# Patient Record
Sex: MALE | Race: BLACK OR AFRICAN AMERICAN | NOT HISPANIC OR LATINO | URBAN - METROPOLITAN AREA
[De-identification: names, ages, dates, MRNs, and addresses within clinical notes are randomized per-mention and may not be internally consistent; named-entity substitution may affect disease eponyms.]

---

## 2019-11-22 ENCOUNTER — HOSPITAL ENCOUNTER (OUTPATIENT)
Dept: FAMILY MEDICINE CLINIC | Facility: CLINIC | Age: 34
Discharge: HOME | End: 2019-11-22
Attending: FAMILY MEDICINE

## 2019-11-22 ENCOUNTER — OFFICE VISIT CONVERTED (OUTPATIENT)
Dept: FAMILY MEDICINE CLINIC | Facility: CLINIC | Age: 34
End: 2019-11-22
Attending: FAMILY MEDICINE

## 2019-11-22 LAB
ALBUMIN SERPL-MCNC: 4.7 G/DL (ref 3.5–5)
ALBUMIN/GLOB SERPL: 1.5 {RATIO} (ref 1.4–2.6)
ALP SERPL-CCNC: 52 U/L (ref 53–128)
ALT SERPL-CCNC: 21 U/L (ref 10–40)
ANION GAP SERPL CALC-SCNC: 17 MMOL/L (ref 8–19)
AST SERPL-CCNC: 28 U/L (ref 15–50)
BASOPHILS # BLD AUTO: 0.04 10*3/UL (ref 0–0.2)
BASOPHILS NFR BLD AUTO: 0.7 % (ref 0–3)
BILIRUB SERPL-MCNC: 0.29 MG/DL (ref 0.2–1.3)
BUN SERPL-MCNC: 12 MG/DL (ref 5–25)
BUN/CREAT SERPL: 11 {RATIO} (ref 6–20)
CALCIUM SERPL-MCNC: 9.8 MG/DL (ref 8.7–10.4)
CHLORIDE SERPL-SCNC: 100 MMOL/L (ref 99–111)
CONV ABS IMM GRAN: 0.01 10*3/UL (ref 0–0.2)
CONV CO2: 26 MMOL/L (ref 22–32)
CONV IMMATURE GRAN: 0.2 % (ref 0–1.8)
CONV TOTAL PROTEIN: 7.8 G/DL (ref 6.3–8.2)
CREAT UR-MCNC: 1.1 MG/DL (ref 0.7–1.2)
DEPRECATED RDW RBC AUTO: 44.3 FL (ref 35.1–43.9)
EOSINOPHIL # BLD AUTO: 0.38 10*3/UL (ref 0–0.7)
EOSINOPHIL # BLD AUTO: 6.2 % (ref 0–7)
ERYTHROCYTE [DISTWIDTH] IN BLOOD BY AUTOMATED COUNT: 12.9 % (ref 11.6–14.4)
GFR SERPLBLD BASED ON 1.73 SQ M-ARVRAT: >60 ML/MIN/{1.73_M2}
GLOBULIN UR ELPH-MCNC: 3.1 G/DL (ref 2–3.5)
GLUCOSE SERPL-MCNC: 113 MG/DL (ref 70–99)
HCT VFR BLD AUTO: 42 % (ref 42–52)
HGB BLD-MCNC: 13.9 G/DL (ref 14–18)
LYMPHOCYTES # BLD AUTO: 2.2 10*3/UL (ref 1–5)
LYMPHOCYTES NFR BLD AUTO: 36.1 % (ref 20–45)
MCH RBC QN AUTO: 31.2 PG (ref 27–31)
MCHC RBC AUTO-ENTMCNC: 33.1 G/DL (ref 33–37)
MCV RBC AUTO: 94.4 FL (ref 80–96)
MONOCYTES # BLD AUTO: 0.65 10*3/UL (ref 0.2–1.2)
MONOCYTES NFR BLD AUTO: 10.7 % (ref 3–10)
NEUTROPHILS # BLD AUTO: 2.82 10*3/UL (ref 2–8)
NEUTROPHILS NFR BLD AUTO: 46.1 % (ref 30–85)
NRBC CBCN: 0 % (ref 0–0.7)
OSMOLALITY SERPL CALC.SUM OF ELEC: 289 MOSM/KG (ref 273–304)
PLATELET # BLD AUTO: 237 10*3/UL (ref 130–400)
PMV BLD AUTO: 10.7 FL (ref 9.4–12.4)
POTASSIUM SERPL-SCNC: 4.1 MMOL/L (ref 3.5–5.3)
RBC # BLD AUTO: 4.45 10*6/UL (ref 4.7–6.1)
SODIUM SERPL-SCNC: 139 MMOL/L (ref 135–147)
WBC # BLD AUTO: 6.1 10*3/UL (ref 4.8–10.8)

## 2020-02-20 ENCOUNTER — HOSPITAL ENCOUNTER (OUTPATIENT)
Dept: OTHER | Facility: HOSPITAL | Age: 35
Discharge: HOME | End: 2020-02-20
Attending: PHYSICIAN ASSISTANT

## 2020-03-17 ENCOUNTER — CONVERSION ENCOUNTER (OUTPATIENT)
Dept: FAMILY MEDICINE CLINIC | Facility: CLINIC | Age: 35
End: 2020-03-17

## 2020-03-17 ENCOUNTER — OFFICE VISIT CONVERTED (OUTPATIENT)
Dept: FAMILY MEDICINE CLINIC | Facility: CLINIC | Age: 35
End: 2020-03-17
Attending: NURSE PRACTITIONER

## 2020-05-12 ENCOUNTER — OFFICE VISIT CONVERTED (OUTPATIENT)
Dept: FAMILY MEDICINE CLINIC | Facility: CLINIC | Age: 35
End: 2020-05-12
Attending: NURSE PRACTITIONER

## 2020-06-30 ENCOUNTER — OFFICE VISIT CONVERTED (OUTPATIENT)
Dept: FAMILY MEDICINE CLINIC | Facility: CLINIC | Age: 35
End: 2020-06-30
Attending: NURSE PRACTITIONER

## 2020-07-14 ENCOUNTER — HOSPITAL ENCOUNTER (OUTPATIENT)
Dept: URGENT CARE | Facility: CLINIC | Age: 35
Discharge: HOME | End: 2020-07-14

## 2020-11-23 ENCOUNTER — OFFICE VISIT CONVERTED (OUTPATIENT)
Dept: FAMILY MEDICINE CLINIC | Facility: CLINIC | Age: 35
End: 2020-11-23
Attending: FAMILY MEDICINE

## 2021-05-13 NOTE — PROGRESS NOTES
Progress Note      Patient Name: Javi Millan   Patient ID: [id number]   Sex: Male   YOB: 1985    Primary Care Provider: Adrianna Soliz MD   Referring Provider: Adrianna Soliz MD    Visit Date: November 23, 2020    Provider: Adrianna Soliz MD   Location: [facility]   Location Address: [address] [place] [address], [address]  [place], [place]  [id number]   Location Phone: [phone]          Chief Complaint  · Annual Physical Exam      History Of Present Illness  Javi Millan is a 34 year old /Black male who presents for evaluation and treatment of:      Pt is here for his annual physical exam.    Elevated blood pressure readings patient reports they have been using his parents blood pressure cuff to check his blood pressures and they have been averaging in the 140s systolic.  Patient has had occasional high blood pressures in the past and recorded in the office.  I will be giving him his own blood pressure cuff that he can report his own readings especially if he is having any headache symptoms.    Headaches patient reports he is waking up in the mornings occasionally with headache mainly in the right side of his temple.  Patient does not report any other symptoms.    Weight gain patient reports that he has gained approximately 60 to 70 pounds over the last few years.  Patient has a 12 pound weight increase since June and reports no change in activity or diet.  Patient will be having his labs checked fasting for thyroid disorders and hyperglycemia.    Asthma patient reports that he is needing to use his nebulizer machine to help with his breathing and shortness of breath.  Patient reports he still using his Breo daily.    Snoring- pt reports that he wakes up in the morning with a headache and he snores loudly which most likely is secondary to an airway obstruction and may be causing his breathing issues while sleeping.  I will be ordering a Sleep study.       Past  "Medical History  Asthma         Medication List  albuterol sulfate 2.5 mg /3 mL (0.083 %) inhalation solution for nebulization; BREO ELLIPTA 200-25MCG ORAL INH(30); Ventolin HFA 90 mcg/actuation inhalation HFA aerosol inhaler         Allergy List  NO KNOWN DRUG ALLERGIES       Allergies Reconciled  Social History  Tobacco (Never)         Immunizations  Name Date Admin   Influenza 11/23/2020   Influenza Refused         Review of Systems  · Constitutional  o Denies  o : fatigue, fever, weight loss, weight gain  · Eyes  o Denies  o : eye discomfort, eye pain  · HENT  o Denies  o : vertigo, nasal congestion  · Cardiovascular  o Denies  o : chest pain, irregular heart beats  · Respiratory  o Denies  o : shortness of breath, wheezing  · Gastrointestinal  o Denies  o : nausea, vomiting  · Genitourinary  o Denies  o : frequency, dysuria  · Integument  o Denies  o : rash, itching  · Neurologic  o Denies  o : muscular weakness, memory difficulties  · Musculoskeletal  o Denies  o : joint swelling, muscle pain  · Psychiatric  o Denies  o : anxiety, depression  · Heme-Lymph  o Denies  o : lightheadedness, easy bleeding  · Allergic-Immunologic  o Denies  o : sinus allergy symptoms, allergic dermatitis      Vitals  Date Time BP Position Site L\R Cuff Size HR RR TEMP (F) WT  HT  BMI kg/m2 BSA m2 O2 Sat FR L/min FiO2        11/23/2020 08:39 /78 Sitting    80 - R  97.7 229lbs 0oz 6'  1\" 30.21 2.31 98 %            Physical Examination  · Constitutional  o Appearance  o : well-nourished, in no acute distress  · Eyes  o Conjunctivae  o : conjunctivae normal  o Sclerae  o : sclerae white  o Pupils and Irises  o : pupils equal, round, and reactive to light and accommodation bilaterally  o Eyelids/Ocular Adnexae  o : eyelid appearance normal, no exudates present  · Ears, Nose, Mouth and Throat  o Ears  o :   § External Ears  § : external auditory canal appearance within normal limits, no discharge present  § Otoscopic " Examination  § : tympanic membrane appearance within normal limits bilaterally  o Nose  o :   § External Nose  § : appearance normal  § Nasopharynx  § : no discharge present  o Oral Cavity  o :   § Oral Mucosa  § : oral mucosa normal  § Lips  § : lip appearance normal  o Throat  o :   § Oropharynx  § : no inflammation or lesions present, tonsils within normal limits  · Neck  o Inspection/Palpation  o : normal appearance, no masses or tenderness, trachea midline  o Range of Motion  o : cervical range of motion within normal limits  o Thyroid  o : gland size normal, nontender, no nodules or masses present on palpation  o Jugular Veins  o : JVP normal  · Respiratory  o Respiratory Effort  o : breathing unlabored  o Inspection of Chest  o : normal appearance  o Auscultation of Lungs  o : normal breath sounds throughout  · Cardiovascular  o Heart  o :   § Auscultation of Heart  § : regular rate and rhythm, no murmurs, gallops or rubs  o Peripheral Vascular System  o :   § Extremities  § : no edema  · Gastrointestinal  o Abdominal Examination  o : abdomen nontender to palpation, tone normal without rigidity or guarding, no masses present, bowel sounds present in all four quadrants  o Liver and spleen  o : no hepatomegaly present, liver nontender to palpation, spleen not palpable  o Hernias  o : no hernias present  · Lymphatic  o Neck  o : no lymphadenopathy present  · Musculoskeletal  o Spine  o :   § Inspection/Palpation  § : no spinal tenderness, scoliosis or kyphosis present  § Stability  § : no subluxations present  § Range of Motion  § : spine range of motion normal  o Right Upper Extremity  o :   § Inspection/Palpation  § : no tenderness to palpation, ROM normal  o Left Upper Extremity  o :   § Inspection/Palpation  § : no tenderness to palpation, ROM normal  o Right Lower Extremity  o :   § Inspection/Palpation  § : no joint or limb tenderness to palpation, ROM normal  o Left Lower Extremity  o :    § Inspection/Palpation  § : no joint or limb tenderness to palpation, ROM normal  · Skin and Subcutaneous Tissue  o General Inspection  o : no rashes or lesions present, no areas of discoloration  o Body Hair  o : scalp palpation normal, hair normal for age, general body hair distribution normal for age  o Digits and Nails  o : no clubbing, cyanosis, deformities or edema present, normal appearing nails  · Neurologic  o Mental Status Examination  o :   § Orientation  § : grossly oriented to person, place and time  o Gait and Station  o : normal gait, able to stand without difficulty  · Psychiatric  o Judgement and Insight  o : judgment and insight intact  o Mood and Affect  o : mood normal, affect appropriate              Assessment  · Annual physical exam     V70.0/Z00.00  · Asthma     493.90/J45.909  · Obesity     278.00/E66.9  · Screening for depression     V79.0/Z13.89  · Need for influenza vaccination     V04.81/Z23  · Elevated blood pressure reading     796.2/R03.0  · Snoring     786.09/R06.83  · Sleep disorder breathing     780.59/G47.30      Plan  · Orders  o Immunization Admin Fee (Single) ([facility]) (49753) - V04.81/Z23 - 11/23/2020  o Fluzone Quadrivalent Vaccine, age 6 months + (21707) - V04.81/Z23 - 11/23/2020   Vaccine - Influenza; Dose: 0.5; Site: Left Deltoid; Route: Intramuscular; Date: 11/23/2020 09:50:00; Exp: 06/30/2021; Lot: [id number]; Mfg: [employer] pasteur; TradeName: Fluzone Quadrivalent; Administered By: Adalgisa Pagan MA; Comment: tolerated well, stable  o Hgb A1c [facility] (36925) - 278.00/E66.9 - 11/23/2020  o Physical, Primary Care Panel (CBC, CMP, Lipid, TSH) [facility] (09755, 03075, 88603, 45469) - 796.2/R03.0, 493.90/J45.909, 278.00/E66.9 - 11/23/2020  o ACO-39: Current medications updated and reviewed (1159F, ) - - 11/23/2020  o ACO-18: Negative screen for clinical depression using a standardized tool () - V79.0/Z13.89 - 11/23/2020  o ACO-14: Influenza immunization administered or previously  received [facility] () - V04.81/Z23 - 11/23/2020  o Sleep Disorder Clinic Consultation (SLEEP) - 780.59/G47.30 - 11/23/2020  · Medications  o albuterol sulfate 2.5 mg /3 mL (0.083 %) inhalation solution for nebulization   SIG: use in nebulizer as directed 2 times a day for 30 days   DISP: (60) Vial with 11 refills  Refilled on 11/23/2020     o Ventolin HFA 90 mcg/actuation inhalation HFA aerosol inhaler   SIG: inhale 2 puffs (180 mcg) by inhalation route every 4-6 hours as needed for 30 days   DISP: (1) Inhaler with 2 refills  Refilled on 11/23/2020     o Medications have been Reconciled  o Transition of Care or Provider Policy  · Instructions  o Reviewed health maintenance flowsheet and updated information. Orders were placed and/or patient's response was documented.  o Depression Screen completed and scanned into the EMR under the designated folder within the patient's documents.  o Today's PHQ-9 result is _0__  o Patient was educated/instructed on their diagnosis, treatment and medications prior to discharge from the clinic today.  o Minutes spent with patient including greater than 50% in Education/Counseling/Care Coordination.  o Time spent with the patient was minutes, more than 50% face to face. 35 minutes  · Disposition  o Return Visit Request in/on 1 month +/- 0 days (66170).            Electronically Signed by: Adrianna Soliz MD -Author on December 15, 2020 04:46:52 PM

## 2021-05-13 NOTE — PROGRESS NOTES
Progress Note      Patient Name: Javi Millan   Patient ID: [id number]   Sex: Male   YOB: 1985    Primary Care Provider: Adrianna Soliz MD    Visit Date: May 12, 2020    Provider: ADELA Ramirez   Location: [facility]   Location Address: [address] [place] [address], [address] [place] [address]  [id number]   Location Phone: [phone]          Chief Complaint  · asthma  · needs meds for asthma      History Of Present Illness  Javi Millan is a 34 year old /Black male who presents for evaluation and treatment of:      For an acute visit today.  He is a patient of Dr. Soliz.    He is complaining that his asthma is not well controlled.  He states he is a  and he has been having more shortness of air and some wheezing.  He states he cannot afford the Pulmicort inhaler, not covered by his insurance.  He is also needing a new nebulizer because his broke.       Past Medical History  Disease Name Date Onset Notes   Asthma --  --          Medication List  Name Date Started Instructions   albuterol sulfate 2.5 mg /3 mL (0.083 %) inhalation solution for nebulization 05/11/2020 use in nebulizer as directed 2 times a day for 30 days   ibuprofen 800 mg oral tablet 11/22/2019 take 1 tablet (800 mg) by oral route 3 times per day with food as needed   ondansetron 4 mg oral tablet,disintegrating 03/17/2020 place 1 tab on top of tongue to dissolve, then swallow by translingual route every 4-6 hours as needed for nausea   Ventolin HFA 90 mcg/actuation inhalation HFA aerosol inhaler 05/12/2020 inhale 2 puffs (180 mcg) by inhalation route every 4-6 hours as needed for 30 days         Allergy List  Allergen Name Date Reaction Notes   NO KNOWN DRUG ALLERGIES --  --  --          Social History  Finding Status Start/Stop Quantity Notes   Tobacco Never --/-- --  --          Immunizations  NameDate Admin Mfg Trade Name Lot Number Route Inj VIS Given VIS Publication   InfluenzaRefused  "03/17/2020 NE Not Entered  NE NE     Comments:          Review of Systems  · Constitutional  o Denies  o : fever, fatigue, weight loss, weight gain  · Cardiovascular  o Denies  o : lower extremity edema, claudication, chest pressure, palpitations  · Respiratory  o Admits  o : shortness of breath, wheezing  o Denies  o : cough, productive cough  · Gastrointestinal  o Denies  o : nausea, vomiting, diarrhea, constipation, abdominal pain  · Integument  o Denies  o : rash, itching      Vitals  Date Time BP Position Site L\R Cuff Size HR RR TEMP (F) WT  HT  BMI kg/m2 BSA m2 O2 Sat        05/12/2020 03:38 /84 Sitting    98 - R  98.6 213lbs 6oz 6'  1\" 28.15 2.23 98 %          Physical Examination  · Constitutional  o Appearance  o : no acute distress, well-nourished  · Head and Face  o Head  o :   § Inspection  § : atraumatic, normocephalic  · Respiratory  o Respiratory Effort  o : breathing comfortably, symmetric chest rise  o Auscultation of Lungs  o : mild wheezing present-right-upper lobe   · Cardiovascular  o Heart  o :   § Auscultation of Heart  § : regular rate and rhythm, no murmurs, rubs, or gallops  · Neurologic  o Mental Status Examination  o :   § Orientation  § : grossly oriented to person, place and time  o Gait and Station  o :   § Gait Screening  § : normal gait  · Psychiatric  o General  o : normal mood and affect          Assessment  · Moderate persistent asthma, unspecified whether complicated     493.90/J45.40    Problems Reconciled  Plan  · Orders  o ACO-39: Current medications updated and reviewed () - - 05/12/2020  · Medications  o prednisone 20 mg oral tablet   SIG: take 1 tablet (20 mg) by oral route 2 times per day for 5 days   DISP: (10) tablets with 0 refills  Prescribed on 05/12/2020     o Breo Ellipta 200-25 mcg/dose inhalation blister with device   SIG: inhale 1 puff by inhalation route once daily at the same time each day for 30 days   DISP: (1) 28 ct blist pack with 2 " refills  Prescribed on 05/12/2020     o Ventolin HFA 90 mcg/actuation inhalation HFA aerosol inhaler   SIG: inhale 2 puffs (180 mcg) by inhalation route every 4-6 hours as needed for 30 days   DISP: (1) 8 gm canister with 2 refills  Adjusted on 05/12/2020     o Pulmicort Flexhaler 90 mcg/actuation inhalation aerosol powdr breath activated   SIG: inhale 2 puffs (180 mcg) by inhalation route 2 times per day for 30 days   DISP: (1) Canister with 2 refills  Discontinued on 05/12/2020     o Medications have been Reconciled  o Transition of Care or Provider Policy  · Instructions  o Patient was educated/instructed on their diagnosis, treatment and medications prior to discharge from the clinic today.  o Patient instructed to seek medical attention urgently for new or worsening symptoms.  o Call the office with any concerns or questions.  o Discussed Covid-19 precautions including, but not limited to, social distancing, avoid touching your face, and hand washing.   · Disposition  o Return to clinic in 3 months     We will start him on Breo Ellipta inhaler.  Demonstrator inhaler demonstrated to patient today.  Also treat him with prednisone 40 mg daily x5 days.    New nebulizer issued to him in office today.    Patient to follow-up with Dr. Soliz in 3 months but sooner if not improving.             Electronically Signed by: ADELA Ramirez -Author on May 12, 2020 03:58:31 PM

## 2021-05-13 NOTE — PROGRESS NOTES
Progress Note      Patient Name: Javi Millan   Patient ID: [id number]   Sex: Male   YOB: 1985    Primary Care Provider: Adrianna Soliz MD    Visit Date: June 30, 2020    Provider: ADELA Ramirez   Location: [facility]   Location Address: [address] [place] [address], [address]  [id number]   Location Phone: [phone]          Chief Complaint  · shortness of air, asthma      History Of Present Illness  Javi Millan is a 34 year old /Black male who presents for evaluation and treatment of:      for an acute visit today.  He is a patient of Dr. Soliz.     He is complaining his asthma is not well controlled again.  He states when he was using the Breo that he felt much better and rarely had to use his albuterol inhaler.  He states that he has been unable to get his Breo refilled this past month.  He states the pharmacist told him he had Pulmicort Rx but he states he did not have good control previously when he was on Pulmicort.   He also states that the pharmacy has been giving him ProAir inhaler instead of Ventolin inhaler and he states it is not as effective.     We called the pharmacy and the issue with Breo was that they were out of stock, but they have one available currently.  The pharmacy also only carries ProAir, not Ventolin Inhalers.     He denies being exposed to Covid-19, denies any fever, chills or loss of taste/smell.       Past Medical History  Disease Name Date Onset Notes   Asthma --  --          Medication List  Name Date Started Instructions   albuterol sulfate 2.5 mg /3 mL (0.083 %) inhalation solution for nebulization 05/11/2020 use in nebulizer as directed 2 times a day for 30 days   Breo Ellipta 200-25 mcg/dose inhalation blister with device 05/12/2020 inhale 1 puff by inhalation route once daily at the same time each day for 30 days   Ventolin HFA 90 mcg/actuation inhalation HFA aerosol inhaler 06/30/2020 inhale 2 puffs (180 mcg) by  "inhalation route every 4-6 hours as needed for 30 days         Allergy List  Allergen Name Date Reaction Notes   NO KNOWN DRUG ALLERGIES --  --  --          Social History  Finding Status Start/Stop Quantity Notes   Tobacco Never --/-- --  --          Immunizations  NameDate Admin Mfg Trade Name Lot Number Route Inj VIS Given VIS Publication   InfluenzaRefused 03/17/2020 NE Not Entered  NE NE     Comments:          Review of Systems  · Constitutional  o Denies  o : fever, fatigue, weight loss, weight gain  · HENT  o Denies  o : headaches, nasal congestion, sore throat, ear pain  · Cardiovascular  o Denies  o : lower extremity edema, claudication, chest pressure, palpitations  · Respiratory  o Admits  o : shortness of breath, cough, dry cough  o Denies  o : wheezing, productive cough  · Gastrointestinal  o Denies  o : nausea, vomiting, diarrhea, constipation, abdominal pain  · Integument  o Denies  o : rash, itching      Vitals  Date Time BP Position Site L\R Cuff Size HR RR TEMP (F) WT  HT  BMI kg/m2 BSA m2 O2 Sat        06/30/2020 10:20 /86 Sitting    97 - R  98.5 217lbs 0oz 6'  1\" 28.63 2.25 96 %          Physical Examination  · Constitutional  o Appearance  o : no acute distress, well-nourished  · Head and Face  o Head  o :   § Inspection  § : atraumatic, normocephalic  · Respiratory  o Respiratory Effort  o : breathing comfortably, symmetric chest rise  o Auscultation of Lungs  o : clear to asculatation bilaterally, no wheezes, rales, or rhonchii  · Cardiovascular  o Heart  o :   § Auscultation of Heart  § : regular rate and rhythm, no murmurs, rubs, or gallops  · Neurologic  o Mental Status Examination  o :   § Orientation  § : grossly oriented to person, place and time  o Gait and Station  o :   § Gait Screening  § : normal gait  · Psychiatric  o General  o : normal mood and affect          Assessment  · Persistent asthma without complication, unspecified asthma severity     493.90/J45.909    Problems " Reconciled  Plan  · Orders  o ACO-39: Current medications updated and reviewed () - - 06/30/2020  · Medications  o Ventolin HFA 90 mcg/actuation inhalation HFA aerosol inhaler   SIG: inhale 2 puffs (180 mcg) by inhalation route every 4-6 hours as needed for 30 days   DISP: (1) 8 gm canister with 2 refills  Adjusted on 06/30/2020     o prednisone 20 mg oral tablet   SIG: take 1 tablet (20 mg) by oral route 2 times per day for 5 days   DISP: (10) tablets with 0 refills  Discontinued on 06/30/2020     o Medications have been Reconciled  o Transition of Care or Provider Policy  · Instructions  o Patient was educated/instructed on their diagnosis, treatment and medications prior to discharge from the clinic today.  o Patient instructed to seek medical attention urgently for new or worsening symptoms.  o Call the office with any concerns or questions.  o Discussed Covid-19 precautions including, but not limited to, social distancing, avoid touching your face, and hand washing.   · Disposition  o Call or Return if symptoms worsen or persist.     Patient will  Breo and resume taking it daily.  He request to have Ventolin Inhaler sent to another pharmacy.   We discussed that he should feel better after resuming Breo but to call or follow up if not improving.             Electronically Signed by: ADELA Ramirez -Author on June 30, 2020 08:25:29 PM

## 2021-05-14 VITALS
OXYGEN SATURATION: 98 % | TEMPERATURE: 97.7 F | BODY MASS INDEX: 30.35 KG/M2 | SYSTOLIC BLOOD PRESSURE: 130 MMHG | DIASTOLIC BLOOD PRESSURE: 78 MMHG | HEART RATE: 80 BPM | WEIGHT: 229 LBS | HEIGHT: 73 IN

## 2021-05-15 VITALS
WEIGHT: 217.25 LBS | SYSTOLIC BLOOD PRESSURE: 158 MMHG | DIASTOLIC BLOOD PRESSURE: 78 MMHG | BODY MASS INDEX: 28.79 KG/M2 | HEIGHT: 73 IN | SYSTOLIC BLOOD PRESSURE: 158 MMHG | OXYGEN SATURATION: 97 % | DIASTOLIC BLOOD PRESSURE: 78 MMHG | TEMPERATURE: 97.9 F | HEART RATE: 88 BPM

## 2021-05-15 VITALS
WEIGHT: 203.12 LBS | TEMPERATURE: 98 F | OXYGEN SATURATION: 95 % | SYSTOLIC BLOOD PRESSURE: 142 MMHG | BODY MASS INDEX: 26.92 KG/M2 | HEIGHT: 73 IN | DIASTOLIC BLOOD PRESSURE: 78 MMHG | HEART RATE: 85 BPM

## 2021-05-15 VITALS
HEART RATE: 97 BPM | SYSTOLIC BLOOD PRESSURE: 130 MMHG | DIASTOLIC BLOOD PRESSURE: 86 MMHG | OXYGEN SATURATION: 96 % | HEIGHT: 73 IN | TEMPERATURE: 98.5 F | WEIGHT: 217 LBS | BODY MASS INDEX: 28.76 KG/M2

## 2021-05-15 VITALS
WEIGHT: 213.37 LBS | HEIGHT: 73 IN | BODY MASS INDEX: 28.28 KG/M2 | OXYGEN SATURATION: 98 % | HEART RATE: 98 BPM | SYSTOLIC BLOOD PRESSURE: 126 MMHG | TEMPERATURE: 98.6 F | DIASTOLIC BLOOD PRESSURE: 84 MMHG

## 2021-06-28 RX ORDER — ALBUTEROL SULFATE 90 UG/1
2 AEROSOL, METERED RESPIRATORY (INHALATION) EVERY 4 HOURS PRN
COMMUNITY
End: 2021-06-28 | Stop reason: SDUPTHER

## 2021-06-28 RX ORDER — ALBUTEROL SULFATE 2.5 MG/.5ML
SOLUTION RESPIRATORY (INHALATION) 2 TIMES DAILY
COMMUNITY
End: 2021-06-28 | Stop reason: SDUPTHER

## 2021-06-28 NOTE — TELEPHONE ENCOUNTER
Caller: Javi Millan    Relationship: Self    Best call back number: [phone]    Medication needed:   BREO ELLIPTA  ALBUTEROL SULFATE HFA VENTOLIN  ALBUTEROL SULFATE INHALATION SOLUTION 2.5MG 3ML      When do you need the refill by: ASAP    What additional details did the patient provide when requesting the medication: PATIENT STATES THAT HE IS GOING OUT OF TOWN TOMORROW NIGHT AND IS NEEDING THESES REFILLS BEFORE HE LEAVES     Does the patient have less than a 3 day supply:  [x] Yes  [] No    What is the patient's preferred pharmacy:    [id number] - [place], [place] - [address] [facility] AT [facility] OF [facility] [address] /[facility] & [place] - [phone] [facility] [phone]   [phone]

## 2021-06-30 RX ORDER — ALBUTEROL SULFATE 90 UG/1
2 AEROSOL, METERED RESPIRATORY (INHALATION) EVERY 4 HOURS PRN
Qty: 18 G | Refills: 5 | Status: SHIPPED | OUTPATIENT
Start: 2021-06-30

## 2021-06-30 RX ORDER — ALBUTEROL SULFATE 2.5 MG/.5ML
0.5 SOLUTION RESPIRATORY (INHALATION) 2 TIMES DAILY
Qty: 20 ML | Refills: 5 | Status: SHIPPED | OUTPATIENT
Start: 2021-06-30 | End: 2021-07-30

## 2021-12-29 ENCOUNTER — TELEPHONE (OUTPATIENT)
Dept: FAMILY MEDICINE CLINIC | Facility: CLINIC | Age: 36
End: 2021-12-29

## 2021-12-29 NOTE — TELEPHONE ENCOUNTER
Caller: RIMA    Relationship: Provider DR. JOSUÉ MIXON'S OFFICE    Best call back number: [phone]    What is the best time to reach you: ANYTIME    Who are you requesting to speak with (clinical staff, provider,  specific staff member): CLINICAL      What was the call regarding: THIS DOCTORS OFFICE IS CALLING TO SEE IF THEY SHOULD MAKE A NEW PATIENT APPOINTMENT FOR [facility]. HE TESTED POSITIVE FOR COVID AND HAS TOLD THEM THAT HE HAS REACHED OUT TWICE TO OUR OFFICE FOR MEDICATION FOR HIS COVID AND HAS HEARD NOTHING FROM US    Do you require a callback: YES

## 2021-12-29 NOTE — TELEPHONE ENCOUNTER
Caller: Javi Millan    Relationship to patient: Self    Best call back number: [phone]    Patient is needing:THE PATIENT CALLED STATING HE TESTED POSITIVE FOR COVID ON 12/22/2021 AND THE PATIENT IS STARTING TO GET A HEADACHE ON THE RIGHT SIDE OF HIS HEAD AND IS VOMITING FROM THE HEADACHE. THE PATIENT STATED HE WOULD LIKE TO KNOW IF HIS PCP CAN CALL SOMETHING IN FOR HIM AND AN INHALER AS WELL TO HELP HIM WITH HIS BREATHING. THE PATIENT WOULD LIKE A CALL BACK TO LET HIM KNOW THIS HAS BEEN SENT TO THE PHARMACY PLEASE ADVISE THANK YOU.         [id number] - [place], [place] - [address] AT [facility] OF [address] & [place] - [phone] [facility] [phone]   [phone]

## 2022-01-13 ENCOUNTER — TELEPHONE (OUTPATIENT)
Dept: FAMILY MEDICINE CLINIC | Facility: CLINIC | Age: 37
End: 2022-01-13

## 2022-01-13 NOTE — TELEPHONE ENCOUNTER
PATIENT MISSED APPOINTMENT ON 01/05/2022 @ 3:00 WITH LIONEL PRAKASH. CALLED NUMBER IN CHART, [phone] NO ANSWER-LEFT MESSAGE EXPLAINING POLICY CONCERNING MISSED APPOINTMENTS AND SAME DAY CANCELLATIONS.

## 2024-10-11 NOTE — TELEPHONE ENCOUNTER
PATIENT MISSED APPOINTMENT ON 01/05/2022. SPOKE TO PATIENT. STATED THAT HE HAD DECIDED TO GO TO ANOTHER PROVIDER AND WILL NOT BE BACK.   
.